# Patient Record
Sex: FEMALE | Race: BLACK OR AFRICAN AMERICAN | NOT HISPANIC OR LATINO | Employment: FULL TIME | URBAN - METROPOLITAN AREA
[De-identification: names, ages, dates, MRNs, and addresses within clinical notes are randomized per-mention and may not be internally consistent; named-entity substitution may affect disease eponyms.]

---

## 2024-04-12 ENCOUNTER — TELEPHONE (OUTPATIENT)
Dept: HEMATOLOGY/ONCOLOGY | Facility: CLINIC | Age: 34
End: 2024-04-12
Payer: COMMERCIAL

## 2024-04-12 ENCOUNTER — PATIENT MESSAGE (OUTPATIENT)
Dept: HEMATOLOGY/ONCOLOGY | Facility: CLINIC | Age: 34
End: 2024-04-12
Payer: COMMERCIAL

## 2024-04-12 DIAGNOSIS — D50.0 IRON DEFICIENCY ANEMIA DUE TO CHRONIC BLOOD LOSS: Primary | ICD-10-CM

## 2024-04-15 ENCOUNTER — LAB VISIT (OUTPATIENT)
Dept: LAB | Facility: HOSPITAL | Age: 34
End: 2024-04-15
Payer: COMMERCIAL

## 2024-04-15 DIAGNOSIS — D50.0 IRON DEFICIENCY ANEMIA DUE TO CHRONIC BLOOD LOSS: ICD-10-CM

## 2024-04-15 PROCEDURE — 83540 ASSAY OF IRON: CPT | Performed by: INTERNAL MEDICINE

## 2024-04-15 PROCEDURE — 82728 ASSAY OF FERRITIN: CPT | Performed by: INTERNAL MEDICINE

## 2024-04-15 PROCEDURE — 36415 COLL VENOUS BLD VENIPUNCTURE: CPT | Mod: PO | Performed by: INTERNAL MEDICINE

## 2024-04-15 PROCEDURE — 85025 COMPLETE CBC W/AUTO DIFF WBC: CPT | Performed by: INTERNAL MEDICINE

## 2024-04-16 ENCOUNTER — TELEPHONE (OUTPATIENT)
Dept: HEMATOLOGY/ONCOLOGY | Facility: CLINIC | Age: 34
End: 2024-04-16
Payer: COMMERCIAL

## 2024-04-16 ENCOUNTER — OFFICE VISIT (OUTPATIENT)
Dept: HEMATOLOGY/ONCOLOGY | Facility: CLINIC | Age: 34
End: 2024-04-16
Payer: COMMERCIAL

## 2024-04-16 DIAGNOSIS — N92.0 MENORRHAGIA WITH REGULAR CYCLE: ICD-10-CM

## 2024-04-16 DIAGNOSIS — D70.8 CHRONIC BENIGN NEUTROPENIA: ICD-10-CM

## 2024-04-16 DIAGNOSIS — L73.2 HIDRADENITIS SUPPURATIVA: ICD-10-CM

## 2024-04-16 DIAGNOSIS — D50.0 IRON DEFICIENCY ANEMIA DUE TO CHRONIC BLOOD LOSS: Primary | ICD-10-CM

## 2024-04-16 LAB
BASOPHILS # BLD AUTO: 0.05 K/UL (ref 0–0.2)
BASOPHILS NFR BLD: 0.8 % (ref 0–1.9)
DIFFERENTIAL METHOD BLD: ABNORMAL
EOSINOPHIL # BLD AUTO: 0.1 K/UL (ref 0–0.5)
EOSINOPHIL NFR BLD: 1.3 % (ref 0–8)
ERYTHROCYTE [DISTWIDTH] IN BLOOD BY AUTOMATED COUNT: 21.1 % (ref 11.5–14.5)
FERRITIN SERPL-MCNC: 4 NG/ML (ref 20–300)
HCT VFR BLD AUTO: 31.9 % (ref 37–48.5)
HGB BLD-MCNC: 8.2 G/DL (ref 12–16)
IMM GRANULOCYTES # BLD AUTO: 0.01 K/UL (ref 0–0.04)
IMM GRANULOCYTES NFR BLD AUTO: 0.2 % (ref 0–0.5)
IRON SERPL-MCNC: 22 UG/DL (ref 30–160)
LYMPHOCYTES # BLD AUTO: 2.9 K/UL (ref 1–4.8)
LYMPHOCYTES NFR BLD: 46.6 % (ref 18–48)
MCH RBC QN AUTO: 17.2 PG (ref 27–31)
MCHC RBC AUTO-ENTMCNC: 25.7 G/DL (ref 32–36)
MCV RBC AUTO: 67 FL (ref 82–98)
MONOCYTES # BLD AUTO: 0.6 K/UL (ref 0.3–1)
MONOCYTES NFR BLD: 9 % (ref 4–15)
NEUTROPHILS # BLD AUTO: 2.6 K/UL (ref 1.8–7.7)
NEUTROPHILS NFR BLD: 42.1 % (ref 38–73)
NRBC BLD-RTO: 0 /100 WBC
PLATELET # BLD AUTO: 463 K/UL (ref 150–450)
PMV BLD AUTO: 9.3 FL (ref 9.2–12.9)
RBC # BLD AUTO: 4.76 M/UL (ref 4–5.4)
SATURATED IRON: 4 % (ref 20–50)
TOTAL IRON BINDING CAPACITY: 564 UG/DL (ref 250–450)
TRANSFERRIN SERPL-MCNC: 381 MG/DL (ref 200–375)
WBC # BLD AUTO: 6.14 K/UL (ref 3.9–12.7)

## 2024-04-16 PROCEDURE — 1159F MED LIST DOCD IN RCRD: CPT | Mod: CPTII,95,, | Performed by: NURSE PRACTITIONER

## 2024-04-16 PROCEDURE — 99214 OFFICE O/P EST MOD 30 MIN: CPT | Mod: 95,,, | Performed by: NURSE PRACTITIONER

## 2024-04-16 PROCEDURE — 1160F RVW MEDS BY RX/DR IN RCRD: CPT | Mod: CPTII,95,, | Performed by: NURSE PRACTITIONER

## 2024-04-16 RX ORDER — SODIUM CHLORIDE 0.9 % (FLUSH) 0.9 %
10 SYRINGE (ML) INJECTION
OUTPATIENT
Start: 2024-06-28

## 2024-04-16 RX ORDER — SODIUM CHLORIDE 0.9 % (FLUSH) 0.9 %
10 SYRINGE (ML) INJECTION
Status: CANCELLED | OUTPATIENT
Start: 2024-04-23

## 2024-04-16 RX ORDER — SODIUM CHLORIDE 0.9 % (FLUSH) 0.9 %
10 SYRINGE (ML) INJECTION
Status: CANCELLED | OUTPATIENT
Start: 2024-05-31

## 2024-04-16 RX ORDER — EPINEPHRINE 0.3 MG/.3ML
0.3 INJECTION SUBCUTANEOUS ONCE AS NEEDED
Status: CANCELLED | OUTPATIENT
Start: 2024-04-23

## 2024-04-16 RX ORDER — DIPHENHYDRAMINE HYDROCHLORIDE 50 MG/ML
50 INJECTION INTRAMUSCULAR; INTRAVENOUS ONCE AS NEEDED
OUTPATIENT
Start: 2024-06-21

## 2024-04-16 RX ORDER — EPINEPHRINE 0.3 MG/.3ML
0.3 INJECTION SUBCUTANEOUS ONCE AS NEEDED
OUTPATIENT
Start: 2024-06-14

## 2024-04-16 RX ORDER — DIPHENHYDRAMINE HYDROCHLORIDE 50 MG/ML
50 INJECTION INTRAMUSCULAR; INTRAVENOUS ONCE AS NEEDED
Status: CANCELLED | OUTPATIENT
Start: 2024-04-23

## 2024-04-16 RX ORDER — SODIUM CHLORIDE 0.9 % (FLUSH) 0.9 %
10 SYRINGE (ML) INJECTION
OUTPATIENT
Start: 2024-06-21

## 2024-04-16 RX ORDER — HEPARIN 100 UNIT/ML
500 SYRINGE INTRAVENOUS
Status: CANCELLED | OUTPATIENT
Start: 2024-05-31

## 2024-04-16 RX ORDER — HEPARIN 100 UNIT/ML
500 SYRINGE INTRAVENOUS
OUTPATIENT
Start: 2024-06-28

## 2024-04-16 RX ORDER — HEPARIN 100 UNIT/ML
500 SYRINGE INTRAVENOUS
Status: CANCELLED | OUTPATIENT
Start: 2024-06-07

## 2024-04-16 RX ORDER — SODIUM CHLORIDE 0.9 % (FLUSH) 0.9 %
10 SYRINGE (ML) INJECTION
Status: CANCELLED | OUTPATIENT
Start: 2024-06-07

## 2024-04-16 RX ORDER — DIPHENHYDRAMINE HYDROCHLORIDE 50 MG/ML
50 INJECTION INTRAMUSCULAR; INTRAVENOUS ONCE AS NEEDED
OUTPATIENT
Start: 2024-06-14

## 2024-04-16 RX ORDER — HEPARIN 100 UNIT/ML
500 SYRINGE INTRAVENOUS
OUTPATIENT
Start: 2024-06-21

## 2024-04-16 RX ORDER — DIPHENHYDRAMINE HYDROCHLORIDE 50 MG/ML
50 INJECTION INTRAMUSCULAR; INTRAVENOUS ONCE AS NEEDED
Status: CANCELLED | OUTPATIENT
Start: 2024-06-07

## 2024-04-16 RX ORDER — HEPARIN 100 UNIT/ML
500 SYRINGE INTRAVENOUS
Status: CANCELLED | OUTPATIENT
Start: 2024-04-23

## 2024-04-16 RX ORDER — EPINEPHRINE 0.3 MG/.3ML
0.3 INJECTION SUBCUTANEOUS ONCE AS NEEDED
Status: CANCELLED | OUTPATIENT
Start: 2024-05-31

## 2024-04-16 RX ORDER — DIPHENHYDRAMINE HYDROCHLORIDE 50 MG/ML
50 INJECTION INTRAMUSCULAR; INTRAVENOUS ONCE AS NEEDED
Status: CANCELLED | OUTPATIENT
Start: 2024-05-31

## 2024-04-16 RX ORDER — EPINEPHRINE 0.3 MG/.3ML
0.3 INJECTION SUBCUTANEOUS ONCE AS NEEDED
OUTPATIENT
Start: 2024-06-21

## 2024-04-16 RX ORDER — FERROUS SULFATE 325(65) MG
325 TABLET ORAL DAILY
Qty: 90 TABLET | Refills: 3 | Status: SHIPPED | OUTPATIENT
Start: 2024-04-16 | End: 2025-04-16

## 2024-04-16 RX ORDER — EPINEPHRINE 0.3 MG/.3ML
0.3 INJECTION SUBCUTANEOUS ONCE AS NEEDED
Status: CANCELLED | OUTPATIENT
Start: 2024-06-07

## 2024-04-16 RX ORDER — HEPARIN 100 UNIT/ML
500 SYRINGE INTRAVENOUS
OUTPATIENT
Start: 2024-06-14

## 2024-04-16 RX ORDER — EPINEPHRINE 0.3 MG/.3ML
0.3 INJECTION SUBCUTANEOUS ONCE AS NEEDED
OUTPATIENT
Start: 2024-06-28

## 2024-04-16 RX ORDER — DIPHENHYDRAMINE HYDROCHLORIDE 50 MG/ML
50 INJECTION INTRAMUSCULAR; INTRAVENOUS ONCE AS NEEDED
OUTPATIENT
Start: 2024-06-28

## 2024-04-16 RX ORDER — SODIUM CHLORIDE 0.9 % (FLUSH) 0.9 %
10 SYRINGE (ML) INJECTION
OUTPATIENT
Start: 2024-06-14

## 2024-04-16 NOTE — PATIENT INSTRUCTIONS
Choose iron rich foods coupled with vit c rich foods as vit C enhances iron absorption.     Meat and Eggs    Beef  Lamb  Ham  Turkey  Chicken  Veal  Pork  Dried beef  Liver  Liverwurst  Eggs (any style)  Seafood    Shrimp  Clams  Scallops  Oysters  Tuna  Sardines  Myra  Mackerel  Vegetables    Spinach  Sweet potatoes  Peas  Broccoli  String beans  Beet greens  Dandelion greens  Collards  Kale  Chard  Bread and Cereals    White bread (enriched)  Whole wheat bread  Enriched pasta  Wheat products  Bran cereals  Corn meal  Oat cereal  Cream of Wheat  Rye bread  Enriched rice  Fruit    Strawberries  Watermelon  Raisins  Dates  Figs  Prunes  Prune juice  Dried apricots  Dried peaches  Beans and Other Foods    Tofu  Beans (kidney, garbanzo, or white, canned)  Tomato products (e.g., paste)  Dried peas  Dried beans  Lentils  Instant breakfast  Corn syrup  Maple syrup  Molasses    Good sources of vitamin C  citrus fruit, such as oranges and orange juice. Chi  peppers.  Strawberries, cantaloupe, kiwi, papayas  blackcurrants.  Broccoli and cabbage.  brussels sprouts.  Mustard spinach  Kale  White potatoes.

## 2024-04-16 NOTE — PROGRESS NOTES
PATIENT: Ivana Bajwa  MRN: [id number]  DATE: 4/16/2024    Diagnosis:   1. Iron deficiency anemia due to chronic blood loss    2. Menorrhagia with irregular cycle    3. Chronic benign neutropenia    4. Hidradenitis suppurativa          The patient location is: [place], [place]  The chief complaint leading to consultation is: CAROLINA    Visit type: audiovisual    Face to Face time with patient: 17 mins  31 minutes of total time spent on the encounter, which includes face to face time and non-face to face time preparing to see the patient (eg, review of tests), Obtaining and/or reviewing separately obtained history, Documenting clinical information in the electronic or other health record, Independently interpreting results (not separately reported) and communicating results to the patient/family/caregiver, or Care coordination (not separately reported).         Each patient to whom he or she provides medical services by telemedicine is:  (1) informed of the relationship between the physician and patient and the respective role of any other health care provider with respect to management of the patient; and (2) notified that he or she may decline to receive medical services by telemedicine and may withdraw from such care at any time.    Notes:       Chief Complaint: CAROLINA due to chronic blood loss      Subjective:    History of Present Illness:    As per Dr GE Kauffman's office visit [facility] 5/17/22   Ms. Bajwa is a 33 y.o. female who presented in February 2022 for evaluation and management of iron deficiency anemia. She was referred by nurse practitioner Tova Blackmon.    - Labs on 1/31/22 revealed a moderate microcytic anemia at 8.4 g/dL. Iron studies revealed a decreased ferritin/iron/saturated iron with increased total iron binding capacity.  - CBC on 1/31/22 revealed a decreased neutrophil count at 1.1 k/uL.  - she endorses menorrhagia. She has cycles monthly.  - she takes oral iron.  - she received iron sucrose x  "4 doses in 2022    Interval history:  - she presents for a follow-up appointment for her iron deficiency anemia.  - venofer x4 given [id number]  - -Oct/Nov 2022 x5 doses venofer received  - Reports she is fatigued with brain fog, craving mints/mentos again, denies cp, sob/shah, abdominal pain, hematemesis, melena, easy bleeding or bruising.   - Continues with menorrhagia, IUD 11/30/22 x1 month but bleeding occurred daily so removed, has intermittent "regular and heavy cycles, my last one was real heavy"  - Dealing with hidradenitis suppurative recurrently, taking doxy   - teenage daughter is now also iron deficient   - works full time at nursing home  - still living in [place], prefers [place] location for iron infusions    Past medical, surgical, family, and social histories have been reviewed and updated below.    Past Medical History:   Past Medical History:   Diagnosis Date    Anemia        Past Surgical History:   Past Surgical History:   Procedure Laterality Date     SECTION      TUBAL LIGATION         Family History:   Family History   Problem Relation Name Age of Onset    Diabetes Mellitus Father      Colon cancer Mother  40    Breast cancer Neg Hx      Ovarian cancer Neg Hx         Social History:  reports that she has never smoked. She has never used smokeless tobacco. She reports current alcohol use. She reports that she does not use drugs.    Allergies:  Review of patient's allergies indicates:  No Known Allergies    Medications:  Current Outpatient Medications   Medication Sig Dispense Refill    acyclovir (ZOVIRAX) 200 MG capsule Take 400 mg by mouth.      acyclovir (ZOVIRAX) 800 MG Tab Take 800 mg by mouth.      buPROPion (WELLBUTRIN XL) 150 MG TB24 tablet Take 150 mg by mouth once daily.      ferrous sulfate (FEOSOL) 325 mg (65 mg iron) Tab tablet Take 1 tablet (325 mg total) by mouth once daily. 90 tablet 3     No current facility-administered medications for this visit. "       Review of Systems   Constitutional:  Positive for fatigue.        + brain fog, mint cravings   HENT:  Negative for nosebleeds.    Eyes:  Negative for visual disturbance.   Respiratory:  Negative for cough and shortness of breath.    Cardiovascular:  Negative for chest pain.   Gastrointestinal:  Negative for abdominal pain, blood in stool, constipation, diarrhea, nausea and vomiting.   Endocrine: Positive for cold intolerance.   Genitourinary:  Positive for menstrual problem.   Musculoskeletal:  Negative for back pain.   Skin:  Negative for rash.   Neurological:  Negative for dizziness and headaches.   Hematological:  Negative for adenopathy.   Psychiatric/Behavioral:  Positive for decreased concentration. The patient is not nervous/anxious.        ECOG Performance Status:   ECOG SCORE    1 - Restricted in strenuous activity-ambulatory and able to carry out work of a light nature         Objective:      Vitals:   There were no vitals filed for this visit.        BMI: There is no height or weight on file to calculate BMI.    Physical Exam  Constitutional:       Appearance: She is well-developed.   HENT:      Head: Normocephalic and atraumatic.   Pulmonary:      Effort: Pulmonary effort is normal.      Breath sounds: Normal breath sounds.      Comments: Sitting on her bed doing telehealth visit, talkative, NAD  Neurological:      Mental Status: She is alert and oriented to person, place, and time.   Psychiatric:         Mood and Affect: Mood normal.         Behavior: Behavior normal.         Thought Content: Thought content normal.         Laboratory Data:  Labs have been reviewed.    Lab Results   Component Value Date    WBC 6.14 04/15/2024    HGB 8.2 (L) 04/15/2024    HCT 31.9 (L) 04/15/2024    MCV 67 (L) 04/15/2024     (H) 04/15/2024     Lab Results   Component Value Date    IRON 22 (L) 04/15/2024    TRANSFERRIN 381 (H) 04/15/2024    TIBC 564 (H) 04/15/2024    FESATURATED 4 (L) 04/15/2024      Lab  Results   Component Value Date    FERRITIN 4 (L) 04/15/2024               1/31/22:                Imaging:    Assessment:       1. Iron deficiency anemia due to chronic blood loss    2. Menorrhagia with irregular cycle    3. Chronic benign neutropenia    4. Hidradenitis suppurativa             Plan:     1. Iron deficiency anemia  - I have reviewed the chart  - Labs on 1/31/22 revealed a moderate microcytic anemia at 8.4 g/dL (9/19/22). Iron studies revealed a decreased ferritin/iron/saturated iron with increased total iron binding capacity, slighltly lower than .  - cause is menorrhagia  - she is taking oral iron  - given the severity of her iron deficiency anemia, I recommended iron sucrose x 4 doses.  - she received iron sucrose x 4 doses in February/March 2022.  - Labs have been reviewed. Her hemoglobin is 9.8 g/dL with microcytic anemia However, her iron studies are still consistent with iron deficiency and slightly lower than 5/61/22 labs.  - I recommend another 6 doses of iron sucrose, pt received 5 of 6 with last on 11/10/22.  - seen by gyn 10/31/22, pt will have mirena insertion tomorrow 11/30/22  - 11/28/22 labs reviewed, hgb improved to 10.6g/dL, continued microcytic anemia; iron studies improved with normal low serum iron, low saturated iron, normal tibc, and ferritin is normal at 85 ng/mL. Will add 2 more venofer doses given pt's continued menorrhagia.  - Oct/Nov 2022 x5 doses venofer received  - 2/6/23 continued microcytic anemia with low iron levels  - 4/15/24 h/h 8.2/31.9, microcytic, elevated platelets, iron low with iron sat 4%, tibc 564 ug/dL, ferritin 4 ng/mL, pt will need iv iron and orders placed x6 doses iron sucrose given low ferritin   - encouraged iron rich diet with vit c to enhance absorption, reviewed dietary considerations and printed information placed on discharge instructions  - return to clinic in 4 months with repeat labs, will call prior any worsening symptoms or concerns to move  labs and appt upward.    2. Familial Neutropenia  - Labs have been reviewed. CBC on 5/16/22 and 9/19/22 have revealed a normal WBC and neutrophil count, normal wbc again 11/28/22.    - Normal wbc and diff 4/15/24  -5/16/22 RBC antigens (Tristan A/B) negative benign neutropenia     3. Menorrhagia  - the cause of her iron deficiency anemia  - iud 11/30/22 unsuccessful, cycles alternating light to heavy at present  - defer to gynecology    4. Hidradenitis suppurative  - long history, previous ID management, daily doxy  - management deferred to pcp, if not improving consider return to ID      Advance Care Planning     Power of   After our discussion (at previous visit), the patient decided to complete a HCPOA and appointed her  mother Maggie Bajwa ([phone]) .           - return to clinic in 4 months with repeat labs.      15 mins spent in chart preparation, review of previous records and labs, 31 mins total. Greater than 50% time counseling.      Libertad Amaya, NP-C  [facility]  Hematology/Oncology  [address]  [place]  [phone] [phone]

## 2024-05-08 ENCOUNTER — TELEPHONE (OUTPATIENT)
Dept: INFUSION THERAPY | Facility: HOSPITAL | Age: 34
End: 2024-05-08
Payer: COMMERCIAL

## 2024-05-08 NOTE — TELEPHONE ENCOUNTER
(1st attempt) called to schedule infusions. No answer. Voicemail unavailable. Unable to leave message

## 2024-05-08 NOTE — TELEPHONE ENCOUNTER
Confirmed upcoming infusion appointments with the patient.   5/30 at 230p  6/6 at 230p  6/13 at 230p  6/20 at 230p  6/27at 230p  7/11 at 230p

## 2024-05-30 ENCOUNTER — INFUSION (OUTPATIENT)
Dept: INFUSION THERAPY | Facility: HOSPITAL | Age: 34
End: 2024-05-30
Attending: INTERNAL MEDICINE
Payer: COMMERCIAL

## 2024-05-30 VITALS
OXYGEN SATURATION: 98 % | TEMPERATURE: 99 F | RESPIRATION RATE: 18 BRPM | HEART RATE: 96 BPM | DIASTOLIC BLOOD PRESSURE: 75 MMHG | SYSTOLIC BLOOD PRESSURE: 128 MMHG

## 2024-05-30 DIAGNOSIS — D50.0 IRON DEFICIENCY ANEMIA DUE TO CHRONIC BLOOD LOSS: Primary | ICD-10-CM

## 2024-05-30 PROCEDURE — 96374 THER/PROPH/DIAG INJ IV PUSH: CPT

## 2024-05-30 PROCEDURE — 25000003 PHARM REV CODE 250: Performed by: NURSE PRACTITIONER

## 2024-05-30 PROCEDURE — 63600175 PHARM REV CODE 636 W HCPCS: Performed by: NURSE PRACTITIONER

## 2024-05-30 PROCEDURE — A4216 STERILE WATER/SALINE, 10 ML: HCPCS | Performed by: NURSE PRACTITIONER

## 2024-05-30 RX ORDER — SODIUM CHLORIDE 0.9 % (FLUSH) 0.9 %
10 SYRINGE (ML) INJECTION
Status: DISCONTINUED | OUTPATIENT
Start: 2024-05-30 | End: 2024-05-30 | Stop reason: HOSPADM

## 2024-05-30 RX ORDER — EPINEPHRINE 0.3 MG/.3ML
0.3 INJECTION SUBCUTANEOUS ONCE AS NEEDED
Status: DISCONTINUED | OUTPATIENT
Start: 2024-05-30 | End: 2024-05-30 | Stop reason: HOSPADM

## 2024-05-30 RX ORDER — DIPHENHYDRAMINE HYDROCHLORIDE 50 MG/ML
50 INJECTION INTRAMUSCULAR; INTRAVENOUS ONCE AS NEEDED
Status: DISCONTINUED | OUTPATIENT
Start: 2024-05-30 | End: 2024-05-30 | Stop reason: HOSPADM

## 2024-05-30 RX ADMIN — Medication 10 ML: at 03:05

## 2024-05-30 RX ADMIN — IRON SUCROSE 200 MG: 20 INJECTION, SOLUTION INTRAVENOUS at 02:05

## 2024-05-30 RX ADMIN — Medication 10 ML: at 02:05

## 2024-05-30 RX ADMIN — SODIUM CHLORIDE: 9 INJECTION, SOLUTION INTRAVENOUS at 02:05

## 2024-05-30 NOTE — PLAN OF CARE
Pt received IV Venofer infusion dose 1/6. Pt tolerated it well. AVS sent to portal. Next appointment scheduled. Discharged with no acute distress.

## 2024-06-06 ENCOUNTER — INFUSION (OUTPATIENT)
Dept: INFUSION THERAPY | Facility: HOSPITAL | Age: 34
End: 2024-06-06
Attending: INTERNAL MEDICINE
Payer: COMMERCIAL

## 2024-06-06 VITALS
HEART RATE: 83 BPM | RESPIRATION RATE: 17 BRPM | TEMPERATURE: 99 F | SYSTOLIC BLOOD PRESSURE: 129 MMHG | DIASTOLIC BLOOD PRESSURE: 77 MMHG | OXYGEN SATURATION: 98 %

## 2024-06-06 DIAGNOSIS — D50.0 IRON DEFICIENCY ANEMIA DUE TO CHRONIC BLOOD LOSS: Primary | ICD-10-CM

## 2024-06-06 PROCEDURE — 25000003 PHARM REV CODE 250: Performed by: NURSE PRACTITIONER

## 2024-06-06 PROCEDURE — 96374 THER/PROPH/DIAG INJ IV PUSH: CPT

## 2024-06-06 PROCEDURE — 63600175 PHARM REV CODE 636 W HCPCS: Performed by: NURSE PRACTITIONER

## 2024-06-06 PROCEDURE — A4216 STERILE WATER/SALINE, 10 ML: HCPCS | Performed by: NURSE PRACTITIONER

## 2024-06-06 RX ORDER — SODIUM CHLORIDE 0.9 % (FLUSH) 0.9 %
10 SYRINGE (ML) INJECTION
Status: DISCONTINUED | OUTPATIENT
Start: 2024-06-06 | End: 2024-06-06 | Stop reason: HOSPADM

## 2024-06-06 RX ORDER — DIPHENHYDRAMINE HYDROCHLORIDE 50 MG/ML
50 INJECTION INTRAMUSCULAR; INTRAVENOUS ONCE AS NEEDED
Status: DISCONTINUED | OUTPATIENT
Start: 2024-06-06 | End: 2024-06-06 | Stop reason: HOSPADM

## 2024-06-06 RX ORDER — EPINEPHRINE 0.3 MG/.3ML
0.3 INJECTION SUBCUTANEOUS ONCE AS NEEDED
Status: DISCONTINUED | OUTPATIENT
Start: 2024-06-06 | End: 2024-06-06 | Stop reason: HOSPADM

## 2024-06-06 RX ADMIN — IRON SUCROSE 200 MG: 20 INJECTION, SOLUTION INTRAVENOUS at 02:06

## 2024-06-06 RX ADMIN — SODIUM CHLORIDE, PRESERVATIVE FREE 10 ML: 5 INJECTION INTRAVENOUS at 02:06

## 2024-06-06 RX ADMIN — SODIUM CHLORIDE: 9 INJECTION, SOLUTION INTRAVENOUS at 02:06

## 2024-06-06 NOTE — NURSING
Patient tolerated Venofer IVP dose 2 of 6 well. PIV removed per order. Patient discharged in no acute distress.

## 2024-06-13 ENCOUNTER — INFUSION (OUTPATIENT)
Dept: INFUSION THERAPY | Facility: HOSPITAL | Age: 34
End: 2024-06-13
Attending: INTERNAL MEDICINE
Payer: COMMERCIAL

## 2024-06-13 VITALS
OXYGEN SATURATION: 98 % | DIASTOLIC BLOOD PRESSURE: 71 MMHG | SYSTOLIC BLOOD PRESSURE: 126 MMHG | TEMPERATURE: 99 F | RESPIRATION RATE: 18 BRPM | HEART RATE: 81 BPM

## 2024-06-13 DIAGNOSIS — D50.0 IRON DEFICIENCY ANEMIA DUE TO CHRONIC BLOOD LOSS: Primary | ICD-10-CM

## 2024-06-13 PROCEDURE — A4216 STERILE WATER/SALINE, 10 ML: HCPCS | Performed by: NURSE PRACTITIONER

## 2024-06-13 PROCEDURE — 63600175 PHARM REV CODE 636 W HCPCS: Performed by: NURSE PRACTITIONER

## 2024-06-13 PROCEDURE — 25000003 PHARM REV CODE 250: Performed by: NURSE PRACTITIONER

## 2024-06-13 PROCEDURE — 96374 THER/PROPH/DIAG INJ IV PUSH: CPT

## 2024-06-13 RX ORDER — SODIUM CHLORIDE 0.9 % (FLUSH) 0.9 %
10 SYRINGE (ML) INJECTION
Status: DISCONTINUED | OUTPATIENT
Start: 2024-06-13 | End: 2024-06-13 | Stop reason: HOSPADM

## 2024-06-13 RX ORDER — DIPHENHYDRAMINE HYDROCHLORIDE 50 MG/ML
50 INJECTION INTRAMUSCULAR; INTRAVENOUS ONCE AS NEEDED
Status: DISCONTINUED | OUTPATIENT
Start: 2024-06-13 | End: 2024-06-13 | Stop reason: HOSPADM

## 2024-06-13 RX ORDER — EPINEPHRINE 0.3 MG/.3ML
0.3 INJECTION SUBCUTANEOUS ONCE AS NEEDED
Status: DISCONTINUED | OUTPATIENT
Start: 2024-06-13 | End: 2024-06-13 | Stop reason: HOSPADM

## 2024-06-13 RX ADMIN — Medication 10 ML: at 03:06

## 2024-06-13 RX ADMIN — SODIUM CHLORIDE: 9 INJECTION, SOLUTION INTRAVENOUS at 03:06

## 2024-06-13 RX ADMIN — IRON SUCROSE 200 MG: 20 INJECTION, SOLUTION INTRAVENOUS at 03:06

## 2024-06-13 NOTE — PLAN OF CARE
Pt received IV Venofer infusion dose 3/6. Pt tolerated it well. AVS given. Next appointment scheduled. Discharged with no acute distress.

## 2024-06-20 ENCOUNTER — INFUSION (OUTPATIENT)
Dept: INFUSION THERAPY | Facility: HOSPITAL | Age: 34
End: 2024-06-20
Attending: INTERNAL MEDICINE
Payer: COMMERCIAL

## 2024-06-20 VITALS
HEART RATE: 93 BPM | SYSTOLIC BLOOD PRESSURE: 114 MMHG | OXYGEN SATURATION: 99 % | DIASTOLIC BLOOD PRESSURE: 77 MMHG | RESPIRATION RATE: 18 BRPM

## 2024-06-20 DIAGNOSIS — D50.0 IRON DEFICIENCY ANEMIA DUE TO CHRONIC BLOOD LOSS: Primary | ICD-10-CM

## 2024-06-20 PROCEDURE — A4216 STERILE WATER/SALINE, 10 ML: HCPCS | Performed by: NURSE PRACTITIONER

## 2024-06-20 PROCEDURE — 63600175 PHARM REV CODE 636 W HCPCS: Performed by: NURSE PRACTITIONER

## 2024-06-20 PROCEDURE — 96374 THER/PROPH/DIAG INJ IV PUSH: CPT

## 2024-06-20 PROCEDURE — 25000003 PHARM REV CODE 250: Performed by: NURSE PRACTITIONER

## 2024-06-20 RX ORDER — SODIUM CHLORIDE 0.9 % (FLUSH) 0.9 %
10 SYRINGE (ML) INJECTION
Status: DISCONTINUED | OUTPATIENT
Start: 2024-06-20 | End: 2024-06-20 | Stop reason: HOSPADM

## 2024-06-20 RX ORDER — DIPHENHYDRAMINE HYDROCHLORIDE 50 MG/ML
50 INJECTION INTRAMUSCULAR; INTRAVENOUS ONCE AS NEEDED
Status: DISCONTINUED | OUTPATIENT
Start: 2024-06-20 | End: 2024-06-20 | Stop reason: HOSPADM

## 2024-06-20 RX ORDER — EPINEPHRINE 0.3 MG/.3ML
0.3 INJECTION SUBCUTANEOUS ONCE AS NEEDED
Status: DISCONTINUED | OUTPATIENT
Start: 2024-06-20 | End: 2024-06-20 | Stop reason: HOSPADM

## 2024-06-20 RX ADMIN — Medication 10 ML: at 02:06

## 2024-06-20 RX ADMIN — SODIUM CHLORIDE: 9 INJECTION, SOLUTION INTRAVENOUS at 02:06

## 2024-06-20 RX ADMIN — IRON SUCROSE 200 MG: 20 INJECTION, SOLUTION INTRAVENOUS at 02:06

## 2024-06-20 NOTE — NURSING
Pt tolerated venoder dose 4 of 6 infusion well.  No adverse reaction noted.   IV flushed with NS and D/C per protocol.  Patient left clinic in no acute distress.

## 2024-06-27 ENCOUNTER — TELEPHONE (OUTPATIENT)
Dept: INFUSION THERAPY | Facility: HOSPITAL | Age: 34
End: 2024-06-27
Payer: COMMERCIAL

## 2024-06-27 NOTE — TELEPHONE ENCOUNTER
Pt returned call. Next appointment scheduled for iv venofer 7/11/24 at 2:30. Missed dose today rescheduled to 7/18/24.

## 2024-06-27 NOTE — TELEPHONE ENCOUNTER
Called pt concerning no show for appointment at 2:30 for IV iron, no answer, vm not setup to leave message.

## 2024-07-11 ENCOUNTER — INFUSION (OUTPATIENT)
Dept: INFUSION THERAPY | Facility: HOSPITAL | Age: 34
End: 2024-07-11
Attending: INTERNAL MEDICINE
Payer: COMMERCIAL

## 2024-07-11 VITALS
HEART RATE: 92 BPM | SYSTOLIC BLOOD PRESSURE: 141 MMHG | OXYGEN SATURATION: 100 % | RESPIRATION RATE: 18 BRPM | TEMPERATURE: 99 F | DIASTOLIC BLOOD PRESSURE: 70 MMHG

## 2024-07-11 DIAGNOSIS — D50.0 IRON DEFICIENCY ANEMIA DUE TO CHRONIC BLOOD LOSS: Primary | ICD-10-CM

## 2024-07-11 PROCEDURE — A4216 STERILE WATER/SALINE, 10 ML: HCPCS | Performed by: NURSE PRACTITIONER

## 2024-07-11 PROCEDURE — 63600175 PHARM REV CODE 636 W HCPCS: Performed by: NURSE PRACTITIONER

## 2024-07-11 PROCEDURE — 96374 THER/PROPH/DIAG INJ IV PUSH: CPT

## 2024-07-11 PROCEDURE — 25000003 PHARM REV CODE 250: Performed by: NURSE PRACTITIONER

## 2024-07-11 RX ORDER — DIPHENHYDRAMINE HYDROCHLORIDE 50 MG/ML
50 INJECTION INTRAMUSCULAR; INTRAVENOUS ONCE AS NEEDED
Status: DISCONTINUED | OUTPATIENT
Start: 2024-07-11 | End: 2024-07-11 | Stop reason: HOSPADM

## 2024-07-11 RX ORDER — EPINEPHRINE 0.3 MG/.3ML
0.3 INJECTION SUBCUTANEOUS ONCE AS NEEDED
Status: DISCONTINUED | OUTPATIENT
Start: 2024-07-11 | End: 2024-07-11 | Stop reason: HOSPADM

## 2024-07-11 RX ORDER — SODIUM CHLORIDE 0.9 % (FLUSH) 0.9 %
10 SYRINGE (ML) INJECTION
Status: DISCONTINUED | OUTPATIENT
Start: 2024-07-11 | End: 2024-07-11 | Stop reason: HOSPADM

## 2024-07-11 RX ADMIN — SODIUM CHLORIDE: 9 INJECTION, SOLUTION INTRAVENOUS at 02:07

## 2024-07-11 RX ADMIN — Medication 10 ML: at 02:07

## 2024-07-11 RX ADMIN — IRON SUCROSE 200 MG: 20 INJECTION, SOLUTION INTRAVENOUS at 02:07

## 2024-07-11 NOTE — PLAN OF CARE
Pt received IV Venofer infusion dose 5/6. Pt tolerated it well. AVS given. Next appointment scheduled. Discharged with no acute distress.

## 2024-08-16 ENCOUNTER — LAB VISIT (OUTPATIENT)
Dept: LAB | Facility: HOSPITAL | Age: 34
End: 2024-08-16
Payer: COMMERCIAL

## 2024-08-16 DIAGNOSIS — D50.0 IRON DEFICIENCY ANEMIA DUE TO CHRONIC BLOOD LOSS: ICD-10-CM

## 2024-08-16 LAB
BASOPHILS # BLD AUTO: 0.04 K/UL (ref 0–0.2)
BASOPHILS NFR BLD: 0.6 % (ref 0–1.9)
DIFFERENTIAL METHOD BLD: ABNORMAL
EOSINOPHIL # BLD AUTO: 0.1 K/UL (ref 0–0.5)
EOSINOPHIL NFR BLD: 1.6 % (ref 0–8)
ERYTHROCYTE [DISTWIDTH] IN BLOOD BY AUTOMATED COUNT: 22.9 % (ref 11.5–14.5)
FERRITIN SERPL-MCNC: 26 NG/ML (ref 20–300)
HCT VFR BLD AUTO: 37.7 % (ref 37–48.5)
HGB BLD-MCNC: 10.5 G/DL (ref 12–16)
IMM GRANULOCYTES # BLD AUTO: 0.01 K/UL (ref 0–0.04)
IMM GRANULOCYTES NFR BLD AUTO: 0.1 % (ref 0–0.5)
IRON SERPL-MCNC: 26 UG/DL (ref 30–160)
LYMPHOCYTES # BLD AUTO: 2.2 K/UL (ref 1–4.8)
LYMPHOCYTES NFR BLD: 31.7 % (ref 18–48)
MCH RBC QN AUTO: 22 PG (ref 27–31)
MCHC RBC AUTO-ENTMCNC: 27.9 G/DL (ref 32–36)
MCV RBC AUTO: 79 FL (ref 82–98)
MONOCYTES # BLD AUTO: 0.5 K/UL (ref 0.3–1)
MONOCYTES NFR BLD: 7 % (ref 4–15)
NEUTROPHILS # BLD AUTO: 4 K/UL (ref 1.8–7.7)
NEUTROPHILS NFR BLD: 59 % (ref 38–73)
NRBC BLD-RTO: 0 /100 WBC
PLATELET # BLD AUTO: 321 K/UL (ref 150–450)
PMV BLD AUTO: 9.2 FL (ref 9.2–12.9)
RBC # BLD AUTO: 4.78 M/UL (ref 4–5.4)
SATURATED IRON: 6 % (ref 20–50)
TOTAL IRON BINDING CAPACITY: 420 UG/DL (ref 250–450)
TRANSFERRIN SERPL-MCNC: 284 MG/DL (ref 200–375)
WBC # BLD AUTO: 6.85 K/UL (ref 3.9–12.7)

## 2024-08-16 PROCEDURE — 82728 ASSAY OF FERRITIN: CPT | Performed by: NURSE PRACTITIONER

## 2024-08-16 PROCEDURE — 83540 ASSAY OF IRON: CPT | Performed by: NURSE PRACTITIONER

## 2024-08-16 PROCEDURE — 85025 COMPLETE CBC W/AUTO DIFF WBC: CPT | Performed by: NURSE PRACTITIONER

## 2024-08-16 PROCEDURE — 36415 COLL VENOUS BLD VENIPUNCTURE: CPT | Mod: PO | Performed by: NURSE PRACTITIONER

## 2024-09-26 ENCOUNTER — OFFICE VISIT (OUTPATIENT)
Dept: HEMATOLOGY/ONCOLOGY | Facility: CLINIC | Age: 34
End: 2024-09-26
Payer: COMMERCIAL

## 2024-09-26 DIAGNOSIS — D50.0 IRON DEFICIENCY ANEMIA DUE TO CHRONIC BLOOD LOSS: Primary | ICD-10-CM

## 2024-09-26 DIAGNOSIS — D70.8 CHRONIC BENIGN NEUTROPENIA: ICD-10-CM

## 2024-09-26 DIAGNOSIS — L73.2 HIDRADENITIS SUPPURATIVA: ICD-10-CM

## 2024-09-26 DIAGNOSIS — N92.0 MENORRHAGIA WITH REGULAR CYCLE: ICD-10-CM

## 2024-09-26 PROCEDURE — 1159F MED LIST DOCD IN RCRD: CPT | Mod: CPTII,95,, | Performed by: NURSE PRACTITIONER

## 2024-09-26 PROCEDURE — 1160F RVW MEDS BY RX/DR IN RCRD: CPT | Mod: CPTII,95,, | Performed by: NURSE PRACTITIONER

## 2024-09-26 PROCEDURE — 99212 OFFICE O/P EST SF 10 MIN: CPT | Mod: 95,,, | Performed by: NURSE PRACTITIONER

## 2024-09-26 NOTE — PROGRESS NOTES
Answers submitted by the patient for this visit:  Review of Systems Questionnaire (Submitted on 9/26/2024)  appetite change : No  unexpected weight change: No  mouth sores: No  visual disturbance: No  cough: No  shortness of breath: No  chest pain: No  abdominal pain: No  diarrhea: No  frequency: No  back pain: No  rash: No  headaches: No  adenopathy: No  nervous/ anxious: No    PATIENT: Ivana Bueno  MRN: [id number]  DATE: 9/26/2024    Diagnosis:   1. Iron deficiency anemia due to chronic blood loss    2. Menorrhagia with irregular cycle    3. Chronic benign neutropenia    4. Hidradenitis suppurativa            The patient location is: [place], [place]  The chief complaint leading to consultation is: CAROLINA    Visit type: audiovisual    Face to Face time with patient: 11 mins  20 minutes of total time spent on the encounter, which includes face to face time and non-face to face time preparing to see the patient (eg, review of tests), Obtaining and/or reviewing separately obtained history, Documenting clinical information in the electronic or other health record, Independently interpreting results (not separately reported) and communicating results to the patient/family/caregiver, or Care coordination (not separately reported).         Each patient to whom he or she provides medical services by telemedicine is:  (1) informed of the relationship between the physician and patient and the respective role of any other health care provider with respect to management of the patient; and (2) notified that he or she may decline to receive medical services by telemedicine and may withdraw from such care at any time.    Notes:       Chief Complaint: CAROLINA due to chronic blood loss      Subjective:    History of Present Illness:    As per Dr GE Kauffman's office visit [facility] 5/17/22   Ms. Bueno is a 34 y.o. female who presented in February 2022 for evaluation and management of iron deficiency anemia. She was referred by nurse  "practitioner Tova Blackmon.    - Labs on 22 revealed a moderate microcytic anemia at 8.4 g/dL. Iron studies revealed a decreased ferritin/iron/saturated iron with increased total iron binding capacity.  - CBC on 22 revealed a decreased neutrophil count at 1.1 k/uL.  - she endorses menorrhagia. She has cycles monthly.  - she takes oral iron.  - she received iron sucrose x 4 doses in 2022    Interval history:  - she presents for a follow-up appointment for her iron deficiency anemia 24  - venofer x4 given May/2022  - venofer x5 doses Oct/2022   - venofer x5 doses 2024   - Reports she is fatigued intermittently with headaches, denies pica, decreased concentration, cp, sob/shah, abdominal pain, hematemesis, melena, easy bleeding or bruising.   - Continues with menorrhagia, IUD 11/30/22 x1 month but bleeding occurred daily so removed, has intermittent "regular and heavy cycles, my last one was real heavy"  - she stopped her oral iron ordered by her pcp, denies particular reason  - Dealing with hidradenitis suppurative recurrently, taking doxy   - teenage daughter is now also iron deficient   - works full time at nursing home  - still living in [place], prefers [place] location for iron infusions    Past medical, surgical, family, and social histories have been reviewed and updated below.    Past Medical History:   Past Medical History:   Diagnosis Date    Anemia        Past Surgical History:   Past Surgical History:   Procedure Laterality Date     SECTION      TUBAL LIGATION         Family History:   Family History   Problem Relation Name Age of Onset    Diabetes Mellitus Father      Colon cancer Mother  40    Breast cancer Neg Hx      Ovarian cancer Neg Hx         Social History:  reports that she has never smoked. She has never used smokeless tobacco. She reports current alcohol use. She reports that she does not use drugs.    Allergies:  Review of patient's " allergies indicates:  No Known Allergies    Medications:  Current Outpatient Medications   Medication Sig Dispense Refill    acyclovir (ZOVIRAX) 200 MG capsule Take 400 mg by mouth.      acyclovir (ZOVIRAX) 800 MG Tab Take 800 mg by mouth.      buPROPion (WELLBUTRIN XL) 150 MG TB24 tablet Take 150 mg by mouth once daily.      ferrous sulfate (FEOSOL) 325 mg (65 mg iron) Tab tablet Take 1 tablet (325 mg total) by mouth once daily. 90 tablet 3     No current facility-administered medications for this visit.       Review of Systems   Constitutional:  Positive for fatigue. Negative for appetite change and unexpected weight change.        - brain fog, mint cravings   HENT:  Negative for mouth sores and nosebleeds.    Eyes:  Negative for visual disturbance.   Respiratory:  Negative for cough and shortness of breath.    Cardiovascular:  Negative for chest pain.   Gastrointestinal:  Negative for abdominal pain, blood in stool, constipation, diarrhea, nausea and vomiting.   Endocrine: Positive for cold intolerance.   Genitourinary:  Positive for menstrual problem. Negative for frequency.   Musculoskeletal:  Negative for back pain.   Skin:  Negative for rash.   Neurological:  Positive for headaches. Negative for dizziness.   Hematological:  Negative for adenopathy.   Psychiatric/Behavioral:  Negative for decreased concentration. The patient is not nervous/anxious.        ECOG Performance Status:   ECOG SCORE             Objective:      Vitals:   There were no vitals filed for this visit.  Deferred as this is telehealth      BMI: There is no height or weight on file to calculate BMI.    PHYSICAL EXAM  Limited as this is telehealth    Physical Exam  Constitutional:       General: She is not in acute distress.     Appearance: She is well-developed.   HENT:      Head: Normocephalic and atraumatic.   Pulmonary:      Effort: Pulmonary effort is normal.      Comments: She is in stairwell at work, walking on stairs, talkative with no  notable sob/shah  Skin:     Coloration: Skin is not jaundiced or pale.   Neurological:      Mental Status: She is alert and oriented to person, place, and time.   Psychiatric:         Mood and Affect: Mood normal.         Behavior: Behavior normal.         Thought Content: Thought content normal.         Laboratory Data:  Labs have been reviewed.    Lab Results   Component Value Date    WBC 6.85 08/16/2024    HGB 10.5 (L) 08/16/2024    HCT 37.7 08/16/2024    MCV 79 (L) 08/16/2024     08/16/2024     Lab Results   Component Value Date    IRON 26 (L) 08/16/2024    TRANSFERRIN 284 08/16/2024    TIBC 420 08/16/2024    FESATURATED 6 (L) 08/16/2024      Lab Results   Component Value Date    FERRITIN 26 08/16/2024 1/31/22:                Imaging:    Assessment:       1. Iron deficiency anemia due to chronic blood loss    2. Menorrhagia with irregular cycle    3. Chronic benign neutropenia    4. Hidradenitis suppurativa               Plan:     1. Iron deficiency anemia  - I have reviewed the chart  - Labs on 1/31/22 revealed a moderate microcytic anemia at 8.4 g/dL (9/19/22). Iron studies revealed a decreased ferritin/iron/saturated iron with increased total iron binding capacity, slighltly lower than .  - cause is menorrhagia  - she is taking oral iron  - given the severity of her iron deficiency anemia, I recommended iron sucrose x 4 doses.  - she received iron sucrose x 4 doses in February/March 2022.  - Labs have been reviewed. Her hemoglobin is 9.8 g/dL with microcytic anemia However, her iron studies are still consistent with iron deficiency and slightly lower than 5/61/22 labs.  - I recommend another 6 doses of iron sucrose, pt received 5 of 6 with last on 11/10/22.  - seen by gyn 10/31/22, pt will have mirena insertion tomorrow 11/30/22  - 11/28/22 labs reviewed, hgb improved to 10.6g/dL, continued microcytic anemia; iron studies improved with normal low serum iron, low saturated iron, normal  tibc, and ferritin is normal at 85 ng/mL. Will add 2 more venofer doses given pt's continued menorrhagia.  - Oct/Nov 2022 x5 doses venofer received  - 2/6/23 continued microcytic anemia with low iron levels  - 4/15/24 h/h 8.2/31.9, microcytic, elevated platelets, iron low with iron sat 4%, tibc 564 ug/dL, ferritin 4 ng/mL, pt will need iv iron and orders placed x6 doses iron sucrose given low ferritin   - venofer x5 doses June/July 2024   - labs 8/16/24 hgb 10.5 g/dL, iron low with iron sat 6%, tibc improved to normal, ferritin improved from 4 to 26 ng/mL.  - She remains iron deficient, it is improving, recommended iron infusions which she states unable to complete at present with work and family needs  - she will restart oral iron and vit c with colace to prevent constipation, we extensively discussed iron rich diet with vit c rich foods as well  - explained I am concerned about iron deficiency, do not feel anemia will improve without adequate iron, she verbalizes understanding  - advised to call/message sooner if able to do iron infusions  - return to clinic in 4 months with repeat labs, will call prior any worsening symptoms or concerns to move labs and appt upward.    2. Familial Neutropenia  - Labs have been reviewed. CBC on 5/16/22 and 9/19/22 have revealed a normal WBC and neutrophil count, normal wbc again 11/28/22.    - Normal wbc and diff 4/15/24, 8/16/24  -5/16/22 RBC antigens (Tristan A/B) negative benign neutropenia     3. Menorrhagia  - the cause of her iron deficiency anemia  - iud 11/30/22 unsuccessful, cycles alternating light to heavy at present  - defer to gynecology    4. Hidradenitis suppurative  - long history, previous ID management, daily doxy  - management deferred to pcp, if not improving consider return to ID      Advance Care Planning     Power of   After our discussion (at previous visit), the patient decided to complete a HCPOA and appointed her  mother Maggie Bajwa ([phone])  .           - return to clinic in 4 months with repeat labs.      15 mins spent in chart preparation, review of previous records and labs, 31 mins total. Greater than 50% time counseling.      Libertad Amaya, NP-C  [facility]  Hematology/Oncology  [address] [place] [address]  [place]  [phone]

## 2024-09-26 NOTE — PATIENT INSTRUCTIONS
Iron rich foods listed and then Vit C rich foods listed. Combine vit C rich foods with iron rich foods to enhance absorption.    IRON RICH list from [employer]  Meat and Eggs    Beef  Lamb  Ham  Turkey  Chicken  Veal  Pork  Dried beef  Liver  Liverwurst  Eggs (any style)  Seafood    Shrimp  Clams  Scallops  Oysters  Tuna  Sardines  Myra  Mackerel  Vegetables    Spinach  Sweet potatoes  Peas  Broccoli  String beans  Beet greens  Dandelion greens  Collards  Kale  Chard  Bread and Cereals    White bread (enriched)  Whole wheat bread  Enriched pasta  Wheat products  Bran cereals  Corn meal  Oat cereal  Cream of Wheat  Rye bread  Enriched rice  Fruit    Strawberries  Watermelon  Raisins  Dates  Figs  Prunes  Prune juice  Dried apricots  Dried peaches  Beans and Other Foods    Tofu  Beans (kidney, garbanzo, or white, canned)  Tomato products (e.g., paste)  Dried peas  Dried beans  Lentils  Instant breakfast  Corn syrup  Maple syrup  Molasses      Vit C RICH FOODS  citrus fruit, such as oranges and orange juice, belkys, pineapple.  Peppers.  tomatoes  strawberries.  Guava, kiwi, papaya  Blackcurrants, blackberries.  broccoli.  brussels sprouts.  Mustard greens.  Red cabbage.  Kale.  potatoes.

## 2024-10-24 ENCOUNTER — PATIENT MESSAGE (OUTPATIENT)
Dept: RESEARCH | Facility: HOSPITAL | Age: 34
End: 2024-10-24
Payer: COMMERCIAL